# Patient Record
Sex: MALE | Race: BLACK OR AFRICAN AMERICAN | NOT HISPANIC OR LATINO | URBAN - METROPOLITAN AREA
[De-identification: names, ages, dates, MRNs, and addresses within clinical notes are randomized per-mention and may not be internally consistent; named-entity substitution may affect disease eponyms.]

---

## 2020-08-21 ENCOUNTER — IMPORTED ENCOUNTER (OUTPATIENT)
Dept: URBAN - METROPOLITAN AREA CLINIC 50 | Facility: CLINIC | Age: 55
End: 2020-08-21

## 2020-09-25 ENCOUNTER — IMPORTED ENCOUNTER (OUTPATIENT)
Dept: URBAN - METROPOLITAN AREA CLINIC 50 | Facility: CLINIC | Age: 55
End: 2020-09-25

## 2020-12-29 ENCOUNTER — IMPORTED ENCOUNTER (OUTPATIENT)
Dept: URBAN - METROPOLITAN AREA CLINIC 50 | Facility: CLINIC | Age: 55
End: 2020-12-29

## 2021-04-17 ASSESSMENT — PACHYMETRY
OD_CT_UM: 521
OD_CT_UM: 521
OS_CT_UM: 508
OS_CT_UM: 508

## 2021-04-17 ASSESSMENT — TONOMETRY
OD_IOP_MMHG: 15
OD_IOP_MMHG: 21
OS_IOP_MMHG: 20
OS_IOP_MMHG: 16
OS_IOP_MMHG: 18
OD_IOP_MMHG: 16

## 2021-04-17 ASSESSMENT — VISUAL ACUITY
OD_PH: 20/30
OD_PH: 20/30
OS_CC: J4@ 15 IN
OS_PH: 20/30-2
OS_PH: 20/30
OS_SC: 20/40-1
OD_CC: J4@ 15 IN
OD_PH: @ 15 IN
OD_SC: 20/50
OD_SC: 20/200
OS_SC: 20/50
OS_PH: @ 15 IN

## 2023-10-25 NOTE — PATIENT DISCUSSION
Discussed lid hygiene, warm compress and eyelid wash. Patient seen at bedside for 60-minute, initial psychology consultation. Patient's wife is present and provides collateral information, per patient consent. Neuropsychologist is introduced as a member of the rehabilitation team and the purpose of consultation is explained. Patient agrees to participate.     Per patient, he had recent back surgery and is presently experiencing various emotions (e.g., sadness, anxiety, fear, regret), as he did not realize what the rehabilitation period was going to be like. He expresses a desire to go home as soon as possible. He indicates motivation to participate in rehabilitation.     Medical records are reviewed. Per records: Mr. 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 MRI confirmed disc bulge and herniation at L5-S1 w/ canal stenosis at L2-3, L3-4, L4-5. Noted to have poor pain control with no relief with epidurals x 3, gabapentin, oxycodone, and tramadol. Patient was admitted for pain management and surgical evaluation. Patient was seen by pain management for optimization of pain meds. Ortho was consulted, he is now s/p L3-S1 laminectomy and L5-S1 PSF on 10/12 with Dr. Alexander. Patient tolerated the procedure well without any intraoperative complications. Patient tolerated physical therapy, weight bearing as tolerated and pain was controlled. Hospital course complicated by leukocytosis, likely post-op reactive leukocytosis that resolved. Medical genetics was consulted given hx of CPT type 2 and followed along throughout hospital stay for comanagement. His CPK levels were monitored and are currently normal. Patient was evaluated by PM&R and therapy for functional deficits, gait/ADL impairments and acute rehabilitation was recommended. Patient was medically optimized for discharge to [facility] on 10/23.